# Patient Record
Sex: FEMALE | Race: WHITE | NOT HISPANIC OR LATINO
[De-identification: names, ages, dates, MRNs, and addresses within clinical notes are randomized per-mention and may not be internally consistent; named-entity substitution may affect disease eponyms.]

---

## 2017-09-29 ENCOUNTER — RX RENEWAL (OUTPATIENT)
Age: 32
End: 2017-09-29

## 2017-09-29 ENCOUNTER — TRANSCRIPTION ENCOUNTER (OUTPATIENT)
Age: 32
End: 2017-09-29

## 2017-11-06 ENCOUNTER — APPOINTMENT (OUTPATIENT)
Dept: OBGYN | Facility: CLINIC | Age: 32
End: 2017-11-06
Payer: COMMERCIAL

## 2017-11-06 VITALS
BODY MASS INDEX: 37.76 KG/M2 | DIASTOLIC BLOOD PRESSURE: 86 MMHG | SYSTOLIC BLOOD PRESSURE: 129 MMHG | HEIGHT: 61 IN | WEIGHT: 200 LBS

## 2017-11-06 PROCEDURE — 99395 PREV VISIT EST AGE 18-39: CPT

## 2017-11-06 RX ORDER — NORGESTIMATE AND ETHINYL ESTRADIOL 7DAYSX3 LO
0.18/0.215/0.25 KIT ORAL
Qty: 84 | Refills: 3 | Status: ACTIVE | COMMUNITY
Start: 2017-11-06 | End: 1900-01-01

## 2017-11-08 LAB
C TRACH RRNA SPEC QL NAA+PROBE: NOT DETECTED
HPV HIGH+LOW RISK DNA PNL CVX: DETECTED
N GONORRHOEA RRNA SPEC QL NAA+PROBE: NOT DETECTED
SOURCE TP AMPLIFICATION: NORMAL

## 2017-11-09 LAB — CYTOLOGY CVX/VAG DOC THIN PREP: NORMAL

## 2018-04-19 ENCOUNTER — RX RENEWAL (OUTPATIENT)
Age: 33
End: 2018-04-19

## 2018-04-25 ENCOUNTER — APPOINTMENT (OUTPATIENT)
Dept: OBGYN | Facility: CLINIC | Age: 33
End: 2018-04-25
Payer: COMMERCIAL

## 2018-04-25 VITALS
BODY MASS INDEX: 37.76 KG/M2 | DIASTOLIC BLOOD PRESSURE: 88 MMHG | HEIGHT: 61 IN | SYSTOLIC BLOOD PRESSURE: 136 MMHG | WEIGHT: 200 LBS

## 2018-04-25 DIAGNOSIS — L29.8 OTHER PRURITUS: ICD-10-CM

## 2018-04-25 PROCEDURE — 99213 OFFICE O/P EST LOW 20 MIN: CPT

## 2018-04-27 LAB
CANDIDA VAG CYTO: NOT DETECTED
G VAGINALIS+PREV SP MTYP VAG QL MICRO: NOT DETECTED
T VAGINALIS VAG QL WET PREP: NOT DETECTED

## 2018-05-09 ENCOUNTER — APPOINTMENT (OUTPATIENT)
Dept: OBGYN | Facility: CLINIC | Age: 33
End: 2018-05-09
Payer: COMMERCIAL

## 2018-05-09 DIAGNOSIS — B97.7 PAPILLOMAVIRUS AS THE CAUSE OF DISEASES CLASSIFIED ELSEWHERE: ICD-10-CM

## 2018-05-09 LAB — HCG UR QL: NEGATIVE

## 2018-05-09 PROCEDURE — 57454 BX/CURETT OF CERVIX W/SCOPE: CPT

## 2018-05-09 PROCEDURE — 81025 URINE PREGNANCY TEST: CPT

## 2018-05-17 LAB — CORE LAB BIOPSY: NORMAL

## 2018-11-13 ENCOUNTER — RX RENEWAL (OUTPATIENT)
Age: 33
End: 2018-11-13

## 2019-02-05 ENCOUNTER — MEDICATION RENEWAL (OUTPATIENT)
Age: 34
End: 2019-02-05

## 2019-02-06 ENCOUNTER — RX RENEWAL (OUTPATIENT)
Age: 34
End: 2019-02-06

## 2019-02-06 RX ORDER — NORGESTIMATE AND ETHINYL ESTRADIOL 7DAYSX3 LO
0.18/0.215/0.25 KIT ORAL
Qty: 84 | Refills: 0 | Status: ACTIVE | COMMUNITY
Start: 2018-11-13 | End: 1900-01-01

## 2019-02-07 ENCOUNTER — APPOINTMENT (OUTPATIENT)
Dept: OBGYN | Facility: CLINIC | Age: 34
End: 2019-02-07
Payer: COMMERCIAL

## 2019-02-07 VITALS
BODY MASS INDEX: 37.76 KG/M2 | WEIGHT: 200 LBS | HEIGHT: 61 IN | DIASTOLIC BLOOD PRESSURE: 90 MMHG | SYSTOLIC BLOOD PRESSURE: 138 MMHG

## 2019-02-07 PROCEDURE — 99395 PREV VISIT EST AGE 18-39: CPT

## 2019-02-13 LAB
CYTOLOGY CVX/VAG DOC THIN PREP: NORMAL
HPV HIGH+LOW RISK DNA PNL CVX: DETECTED

## 2019-03-11 ENCOUNTER — RX RENEWAL (OUTPATIENT)
Age: 34
End: 2019-03-11

## 2019-04-19 ENCOUNTER — TRANSCRIPTION ENCOUNTER (OUTPATIENT)
Age: 34
End: 2019-04-19

## 2019-11-10 ENCOUNTER — TRANSCRIPTION ENCOUNTER (OUTPATIENT)
Age: 34
End: 2019-11-10

## 2020-01-06 ENCOUNTER — RX RENEWAL (OUTPATIENT)
Age: 35
End: 2020-01-06

## 2020-01-06 RX ORDER — NORGESTIMATE AND ETHINYL ESTRADIOL 7DAYSX3 LO
0.18/0.215/0.25 KIT ORAL
Qty: 28 | Refills: 1 | Status: ACTIVE | COMMUNITY
Start: 2019-02-07 | End: 1900-01-01

## 2021-01-26 ENCOUNTER — TRANSCRIPTION ENCOUNTER (OUTPATIENT)
Age: 36
End: 2021-01-26

## 2021-03-04 ENCOUNTER — RX RENEWAL (OUTPATIENT)
Age: 36
End: 2021-03-04

## 2021-03-11 ENCOUNTER — APPOINTMENT (OUTPATIENT)
Dept: OBGYN | Facility: CLINIC | Age: 36
End: 2021-03-11
Payer: COMMERCIAL

## 2021-03-11 VITALS
WEIGHT: 200 LBS | HEIGHT: 61 IN | BODY MASS INDEX: 37.76 KG/M2 | DIASTOLIC BLOOD PRESSURE: 83 MMHG | SYSTOLIC BLOOD PRESSURE: 134 MMHG

## 2021-03-11 PROCEDURE — 99395 PREV VISIT EST AGE 18-39: CPT

## 2021-03-11 PROCEDURE — 99072 ADDL SUPL MATRL&STAF TM PHE: CPT

## 2021-03-11 NOTE — HISTORY OF PRESENT ILLNESS
[FreeTextEntry1] : pt is a 35 y/o p0 lmp 2/28 presents for annual gyn visit  [No] : Patient does not have concerns regarding sex

## 2021-03-15 LAB — HPV HIGH+LOW RISK DNA PNL CVX: NOT DETECTED

## 2021-03-16 LAB — CYTOLOGY CVX/VAG DOC THIN PREP: NORMAL

## 2021-12-03 ENCOUNTER — TRANSCRIPTION ENCOUNTER (OUTPATIENT)
Age: 36
End: 2021-12-03

## 2022-01-28 ENCOUNTER — RX RENEWAL (OUTPATIENT)
Age: 37
End: 2022-01-28

## 2022-04-21 ENCOUNTER — RX RENEWAL (OUTPATIENT)
Age: 37
End: 2022-04-21

## 2022-04-26 ENCOUNTER — NON-APPOINTMENT (OUTPATIENT)
Age: 37
End: 2022-04-26

## 2022-05-02 ENCOUNTER — APPOINTMENT (OUTPATIENT)
Dept: OBGYN | Facility: CLINIC | Age: 37
End: 2022-05-02
Payer: COMMERCIAL

## 2022-05-02 VITALS
SYSTOLIC BLOOD PRESSURE: 145 MMHG | HEIGHT: 61 IN | DIASTOLIC BLOOD PRESSURE: 83 MMHG | BODY MASS INDEX: 37.76 KG/M2 | WEIGHT: 200 LBS

## 2022-05-02 DIAGNOSIS — R92.2 INCONCLUSIVE MAMMOGRAM: ICD-10-CM

## 2022-05-02 PROCEDURE — 99395 PREV VISIT EST AGE 18-39: CPT

## 2022-05-02 NOTE — HISTORY OF PRESENT ILLNESS
[FreeTextEntry1] : pt is a 36 y/o p0 lmp 3/17 presents for annual gyn visit bilateral axillary soreness

## 2022-05-04 ENCOUNTER — TRANSCRIPTION ENCOUNTER (OUTPATIENT)
Age: 37
End: 2022-05-04

## 2022-05-05 ENCOUNTER — NON-APPOINTMENT (OUTPATIENT)
Age: 37
End: 2022-05-05

## 2022-05-06 LAB
CYTOLOGY CVX/VAG DOC THIN PREP: NORMAL
HPV HIGH+LOW RISK DNA PNL CVX: DETECTED

## 2022-05-08 ENCOUNTER — NON-APPOINTMENT (OUTPATIENT)
Age: 37
End: 2022-05-08

## 2022-05-18 ENCOUNTER — NON-APPOINTMENT (OUTPATIENT)
Age: 37
End: 2022-05-18

## 2022-12-14 ENCOUNTER — NON-APPOINTMENT (OUTPATIENT)
Age: 37
End: 2022-12-14

## 2023-04-04 ENCOUNTER — NON-APPOINTMENT (OUTPATIENT)
Age: 38
End: 2023-04-04

## 2023-04-05 ENCOUNTER — RX RENEWAL (OUTPATIENT)
Age: 38
End: 2023-04-05

## 2023-04-05 RX ORDER — NORGESTIMATE AND ETHINYL ESTRADIOL 7DAYSX3 LO
0.18/0.215/0.25 KIT ORAL
Qty: 84 | Refills: 3 | Status: ACTIVE | COMMUNITY
Start: 2021-03-11 | End: 1900-01-01

## 2023-04-20 ENCOUNTER — NON-APPOINTMENT (OUTPATIENT)
Age: 38
End: 2023-04-20

## 2023-04-20 ENCOUNTER — APPOINTMENT (OUTPATIENT)
Dept: OBGYN | Facility: CLINIC | Age: 38
End: 2023-04-20
Payer: COMMERCIAL

## 2023-04-20 VITALS
DIASTOLIC BLOOD PRESSURE: 87 MMHG | SYSTOLIC BLOOD PRESSURE: 130 MMHG | WEIGHT: 200 LBS | HEIGHT: 61 IN | BODY MASS INDEX: 37.76 KG/M2

## 2023-04-20 DIAGNOSIS — N90.89 OTHER SPECIFIED NONINFLAMMATORY DISORDERS OF VULVA AND PERINEUM: ICD-10-CM

## 2023-04-20 PROCEDURE — 99213 OFFICE O/P EST LOW 20 MIN: CPT

## 2023-04-20 NOTE — DISCUSSION/SUMMARY
[FreeTextEntry1] : likely draining small vulvar abscess\par recommend warm compress/sitz bath\par f/u with Dr. Noguera as scheduled for annual to reasses

## 2023-04-20 NOTE — PHYSICAL EXAM
[Alert] : alert [Appropriately responsive] : appropriately responsive [No Acute Distress] : no acute distress [Oriented x3] : oriented x3 [FreeTextEntry1] : pinpoint ulceration left vulva near introitus  - drainig scant yellow fluid - no induration, no edema, no erythema, minimally tender

## 2023-04-20 NOTE — HISTORY OF PRESENT ILLNESS
[TextBox_4] : Pt presents with c/o noticing a sore on the inside of her labia since the weekend, feels tender - today has been the best it has been all week.  Has to dab it when she pees because its sore and theres a little blood there.  Has never had this before. Denies fever or chills.  Denies h/o genital herpes.  Is sexually active with .  DId have shingles 2014.

## 2023-04-21 LAB
HSV+VZV DNA SPEC QL NAA+PROBE: NOT DETECTED
SPECIMEN SOURCE: NORMAL

## 2023-04-24 ENCOUNTER — NON-APPOINTMENT (OUTPATIENT)
Age: 38
End: 2023-04-24

## 2023-05-04 ENCOUNTER — APPOINTMENT (OUTPATIENT)
Dept: OBGYN | Facility: CLINIC | Age: 38
End: 2023-05-04
Payer: COMMERCIAL

## 2023-05-04 VITALS
SYSTOLIC BLOOD PRESSURE: 142 MMHG | WEIGHT: 213 LBS | HEIGHT: 61 IN | DIASTOLIC BLOOD PRESSURE: 88 MMHG | BODY MASS INDEX: 40.22 KG/M2

## 2023-05-04 DIAGNOSIS — Z01.419 ENCOUNTER FOR GYNECOLOGICAL EXAMINATION (GENERAL) (ROUTINE) W/OUT ABNORMAL FINDINGS: ICD-10-CM

## 2023-05-04 PROCEDURE — 99396 PREV VISIT EST AGE 40-64: CPT

## 2023-05-08 ENCOUNTER — NON-APPOINTMENT (OUTPATIENT)
Age: 38
End: 2023-05-08

## 2023-05-08 LAB — HPV HIGH+LOW RISK DNA PNL CVX: DETECTED

## 2023-05-09 LAB — CYTOLOGY CVX/VAG DOC THIN PREP: NORMAL

## 2023-10-16 ENCOUNTER — NON-APPOINTMENT (OUTPATIENT)
Age: 38
End: 2023-10-16

## 2024-01-17 ENCOUNTER — NON-APPOINTMENT (OUTPATIENT)
Age: 39
End: 2024-01-17

## 2024-06-06 ENCOUNTER — RX RENEWAL (OUTPATIENT)
Age: 39
End: 2024-06-06

## 2024-06-26 RX ORDER — NORGESTIMATE AND ETHINYL ESTRADIOL 7DAYSX3 LO
0.18/0.215/0.25 KIT ORAL
Qty: 28 | Refills: 0 | Status: ACTIVE | COMMUNITY
Start: 2023-05-04 | End: 1900-01-01

## 2024-07-03 ENCOUNTER — NON-APPOINTMENT (OUTPATIENT)
Age: 39
End: 2024-07-03

## 2024-07-15 ENCOUNTER — NON-APPOINTMENT (OUTPATIENT)
Age: 39
End: 2024-07-15

## 2024-07-15 ENCOUNTER — APPOINTMENT (OUTPATIENT)
Dept: OBGYN | Facility: CLINIC | Age: 39
End: 2024-07-15
Payer: COMMERCIAL

## 2024-07-15 VITALS
BODY MASS INDEX: 37.95 KG/M2 | HEIGHT: 61 IN | WEIGHT: 201 LBS | SYSTOLIC BLOOD PRESSURE: 129 MMHG | DIASTOLIC BLOOD PRESSURE: 87 MMHG

## 2024-07-15 DIAGNOSIS — Z01.419 ENCOUNTER FOR GYNECOLOGICAL EXAMINATION (GENERAL) (ROUTINE) W/OUT ABNORMAL FINDINGS: ICD-10-CM

## 2024-07-15 DIAGNOSIS — R10.2 PELVIC AND PERINEAL PAIN: ICD-10-CM

## 2024-07-15 DIAGNOSIS — Z91.89 OTHER SPECIFIED PERSONAL RISK FACTORS, NOT ELSEWHERE CLASSIFIED: ICD-10-CM

## 2024-07-15 PROCEDURE — 99396 PREV VISIT EST AGE 40-64: CPT

## 2024-07-17 LAB — HPV HIGH+LOW RISK DNA PNL CVX: NOT DETECTED

## 2024-07-19 LAB — CYTOLOGY CVX/VAG DOC THIN PREP: NORMAL

## 2024-07-24 ENCOUNTER — ASOB RESULT (OUTPATIENT)
Age: 39
End: 2024-07-24

## 2024-07-24 ENCOUNTER — APPOINTMENT (OUTPATIENT)
Dept: OBGYN | Facility: CLINIC | Age: 39
End: 2024-07-24
Payer: COMMERCIAL

## 2024-07-24 PROCEDURE — 76830 TRANSVAGINAL US NON-OB: CPT

## 2024-07-29 ENCOUNTER — NON-APPOINTMENT (OUTPATIENT)
Age: 39
End: 2024-07-29

## 2024-07-30 ENCOUNTER — APPOINTMENT (OUTPATIENT)
Dept: SURGERY | Facility: CLINIC | Age: 39
End: 2024-07-30
Payer: COMMERCIAL

## 2024-07-30 VITALS
TEMPERATURE: 98.6 F | SYSTOLIC BLOOD PRESSURE: 137 MMHG | HEIGHT: 61 IN | BODY MASS INDEX: 37.95 KG/M2 | DIASTOLIC BLOOD PRESSURE: 84 MMHG | OXYGEN SATURATION: 99 % | HEART RATE: 87 BPM | WEIGHT: 201 LBS

## 2024-07-30 DIAGNOSIS — K80.20 CALCULUS OF GALLBLADDER W/OUT CHOLECYSTITIS W/OUT OBSTRUCTION: ICD-10-CM

## 2024-07-30 DIAGNOSIS — E66.9 OBESITY, UNSPECIFIED: ICD-10-CM

## 2024-07-30 PROCEDURE — 99205 OFFICE O/P NEW HI 60 MIN: CPT

## 2024-07-30 NOTE — PHYSICAL EXAM
[Normal Breath Sounds] : Normal breath sounds [Normal Heart Sounds] : normal heart sounds [Normal Rate and Rhythm] : normal rate and rhythm [No Rash or Lesion] : No rash or lesion [Alert] : alert [Oriented to Person] : oriented to person [Oriented to Place] : oriented to place [Oriented to Time] : oriented to time [Calm] : calm [de-identified] : Obese young woman in no acute distress [de-identified] : NCAT, PERRLA, EOMI.  No scleral icterus or jaundice [de-identified] : Obese, soft, nontender, nondistended, positive bowel sounds in all four quadrants.  No hernia or masses.  Negative Conklin sign [de-identified] : Ambulating without difficulty or assistance

## 2024-07-30 NOTE — ASSESSMENT
[FreeTextEntry1] : 39-year-old obese woman presenting for evaluation of incidentally identified gallstones.  The patient had an outpatient ultrasonography done which demonstrated a 1.5 cm stone impacted in the gallbladder neck.  She describes no adverse events or symptoms.  Denies biliary colic, postprandial nausea or vomiting.  She describes only intermittent dyspepsia and reflux.  She has a history of hyperlipidemia and is presently on statins.  Denies family history of gallstones.  Ultrasound has been reviewed.  Performed at [facility] radiology in June 2024.  No signs of acute cholecystitis but again a 1.5 cm gallstone was identified in the neck of the gallbladder.  No sonographic Conklin sign.  Examination today the patient again remains asymptomatic.  There is no right upper quadrant discomfort tenderness or palpable masses.  I discussed with her elective outpatient cholecystectomy.  Laparoscopic versus robotic assisted approaches.  I recommended robotic assisted laparoscopic cholecystectomy with ICG cholangiography. Risk, Benefits, and Alternatives to surgery have been discussed.  This includes but is not limited to bleeding, infection, damage to adjacent structures, need for additional surgery or interventions, adverse effects of anesthesia such as cardio-respiratory complications, prolonged intubation, cardiac arrhythmia, arrest, and or death.  Risks of forgoing surgery have also been discussed including progression of, and/or worsening of current condition which may then require urgent or emergent treatment or surgery.  Educational materials courtesy of the [employer] with respect to cholecystectomy have been provided for the patient's review and all questions have been answered.  Routine presurgical testing will be arranged if the patient wishes to move forward with surgery and she will be electively scheduled.  Presently however she wishes to discuss further with her  as far as determination of where she was to pursue surgery.  She has been instructed to contact my  should she wish to proceed.  Otherwise follow-up as needed  Nutritional and weight loss counseling has been provided. The importance of weight reduction in the treatment of Obesity and its contribution to resolution of obesity related comorbidities has been discussed.  The patient is encouraged to remain calorie conscious and continue a low fat, low carbohydrate, high protein diet. Eat slowly and chew food well.  Avoid eating and drinking simultaneously.  Also, emphasis has been placed on the importance of adequate hydration, multi-vitamin supplementation and exercise.  (15 min)

## 2024-07-30 NOTE — CONSULT LETTER
[Dear  ___] : Dear  [unfilled], [Courtesy Letter:] : I had the pleasure of seeing your patient, [unfilled], in my office today. [Please see my note below.] : Please see my note below. [Consult Closing:] : Thank you very much for allowing me to participate in the care of this patient.  If you have any questions, please do not hesitate to contact me. [Sincerely,] : Sincerely, [FreeTextEntry3] : Mairo Alfaro MD [facility] Advanced Minimally Invasive, Robotic, General & Bariatric Surgery Chair, Dept of Surgery, [facility] [address] [address] P: [phone] F: [phone]

## 2024-07-31 ENCOUNTER — APPOINTMENT (OUTPATIENT)
Dept: PULMONOLOGY | Facility: CLINIC | Age: 39
End: 2024-07-31
Payer: COMMERCIAL

## 2024-07-31 ENCOUNTER — LABORATORY RESULT (OUTPATIENT)
Age: 39
End: 2024-07-31

## 2024-07-31 VITALS
DIASTOLIC BLOOD PRESSURE: 91 MMHG | BODY MASS INDEX: 38.51 KG/M2 | WEIGHT: 204 LBS | OXYGEN SATURATION: 99 % | RESPIRATION RATE: 16 BRPM | TEMPERATURE: 98 F | SYSTOLIC BLOOD PRESSURE: 145 MMHG | HEART RATE: 93 BPM | HEIGHT: 61 IN

## 2024-07-31 DIAGNOSIS — R05.3 CHRONIC COUGH: ICD-10-CM

## 2024-07-31 PROCEDURE — 94729 DIFFUSING CAPACITY: CPT

## 2024-07-31 PROCEDURE — 36415 COLL VENOUS BLD VENIPUNCTURE: CPT

## 2024-07-31 PROCEDURE — 94727 GAS DIL/WSHOT DETER LNG VOL: CPT

## 2024-07-31 PROCEDURE — ZZZZZ: CPT

## 2024-07-31 PROCEDURE — 94060 EVALUATION OF WHEEZING: CPT

## 2024-07-31 PROCEDURE — 99205 OFFICE O/P NEW HI 60 MIN: CPT | Mod: 25

## 2024-07-31 RX ORDER — PANTOPRAZOLE 40 MG/1
40 TABLET, DELAYED RELEASE ORAL DAILY
Qty: 30 | Refills: 0 | Status: ACTIVE | COMMUNITY
Start: 2024-07-31 | End: 1900-01-01

## 2024-07-31 NOTE — HISTORY OF PRESENT ILLNESS
[Never] : never [TextBox_4] : Ms. LISA CHEN is a 39 year old woman with no sign PMH who is here for evaluation.   April 2020 - started getting mild cough that never went away. She describes mild dry intermittent cough. She feels "itchiness" in her lungs. She feels every breath she takes. Sometiems gets flares when she feels every breath she takes. This is associated with tongue "burring". Was seen by ENT and had unremarkable exam. Was tried on Prilosec for about 2 weeks with no change is sx.  No associated SOB, no wheezing, no chest tightness.  She only reports occ heartburn. Mild seasonal allergies. No pets at home.   ROS:  denies fevers, chills, night sweats, unintentional weight loss denies known autoimmune disease  PMH: tachycardia (on Metoprolol), HLD, no hx of childhood asthma Meds: per chart All: Penicillin SH: never smoker FH: Denies family hx of pulmonary disease PMD: JENNIFER AL

## 2024-07-31 NOTE — ASSESSMENT
[FreeTextEntry1] :  Ms. LISA CHEN is a 39 year old woman with no sign PMH who is here for evaluation.   #Cough -chronic mild cough ongoing since 2020. Describes "burning" in chest, no SOB, no wheezing. Lungs clear. Common etiologies of cough such as LPR, PND, allergies, underlying airway disease or other lung disease discussed with patient. -- PFTs normal today -- obtain CXR -- Obtain allergy panel, IgE, eosinophil count - if + allergies will try oral anti-histamines -- trial of PPI  All questions answered. Patient in agreement with plan.  Follow up in 6-8w  or sooner if needed.

## 2024-07-31 NOTE — CONSULT LETTER
[Dear  ___] : Dear  [unfilled], [Consult Letter:] : I had the pleasure of evaluating your patient, [unfilled]. [Please see my note below.] : Please see my note below. [Consult Closing:] : Thank you very much for allowing me to participate in the care of this patient.  If you have any questions, please do not hesitate to contact me. [FreeTextEntry3] : Sincerely,  Leann Momin MD [facility] tel: [phone] fax: [phone]

## 2024-07-31 NOTE — PHYSICAL EXAM
[No Acute Distress] : no acute distress [Normal Oropharynx] : normal oropharynx [Normal Appearance] : normal appearance [No Neck Mass] : no neck mass [Normal Rate/Rhythm] : normal rate/rhythm [Normal S1, S2] : normal s1, s2 [No Murmurs] : no murmurs [No Resp Distress] : no resp distress [No Abnormalities] : no abnormalities [Benign] : benign [Normal Gait] : normal gait [No Clubbing] : no clubbing [No Cyanosis] : no cyanosis [No Edema] : no edema [FROM] : FROM [Normal Color/ Pigmentation] : normal color/ pigmentation [No Focal Deficits] : no focal deficits [Oriented x3] : oriented x3 [Normal Affect] : normal affect [TextBox_68] : slightly decreased air movement

## 2024-08-06 LAB
A ALTERNATA IGE QN: <0.1 KUA/L
A FUMIGATUS IGE QN: <0.1 KUA/L
BASOPHILS # BLD AUTO: 0.07 K/UL
BASOPHILS NFR BLD AUTO: 0.7 %
BERMUDA GRASS IGE QN: <0.1 KUA/L
BOXELDER IGE QN: <0.1 KUA/L
C HERBARUM IGE QN: <0.1 KUA/L
CALIF WALNUT IGE QN: <0.1 KUA/L
CAT DANDER IGE QN: <0.1 KUA/L
CMN PIGWEED IGE QN: <0.1 KUA/L
COMMON RAGWEED IGE QN: 0.11 KUA/L
COTTONWOOD IGE QN: <0.1 KUA/L
D FARINAE IGE QN: <0.1 KUA/L
D PTERONYSS IGE QN: <0.1 KUA/L
DEPRECATED A ALTERNATA IGE RAST QL: 0 (ref 0–?)
DEPRECATED A FUMIGATUS IGE RAST QL: 0 (ref 0–?)
DEPRECATED BERMUDA GRASS IGE RAST QL: 0 (ref 0–?)
DEPRECATED BOXELDER IGE RAST QL: 0 (ref 0–?)
DEPRECATED C HERBARUM IGE RAST QL: 0 (ref 0–?)
DEPRECATED CAT DANDER IGE RAST QL: 0 (ref 0–?)
DEPRECATED COMMON PIGWEED IGE RAST QL: 0 (ref 0–?)
DEPRECATED COMMON RAGWEED IGE RAST QL: NORMAL (ref 0–?)
DEPRECATED COTTONWOOD IGE RAST QL: 0 (ref 0–?)
DEPRECATED D FARINAE IGE RAST QL: 0 (ref 0–?)
DEPRECATED D PTERONYSS IGE RAST QL: 0 (ref 0–?)
DEPRECATED DOG DANDER IGE RAST QL: 0 (ref 0–?)
DEPRECATED GOOSEFOOT IGE RAST QL: 0 (ref 0–?)
DEPRECATED LONDON PLANE IGE RAST QL: 0 (ref 0–?)
DEPRECATED MOUSE URINE PROT IGE RAST QL: 0 (ref 0–?)
DEPRECATED MUGWORT IGE RAST QL: NORMAL (ref 0–?)
DEPRECATED P NOTATUM IGE RAST QL: 0 (ref 0–?)
DEPRECATED RED CEDAR IGE RAST QL: 0 (ref 0–?)
DEPRECATED ROACH IGE RAST QL: 0 (ref 0–?)
DEPRECATED SHEEP SORREL IGE RAST QL: 0 (ref 0–?)
DEPRECATED SILVER BIRCH IGE RAST QL: 0 (ref 0–?)
DEPRECATED TIMOTHY IGE RAST QL: 0 (ref 0–?)
DEPRECATED WHITE ASH IGE RAST QL: 0 (ref 0–?)
DEPRECATED WHITE OAK IGE RAST QL: 0 (ref 0–?)
DOG DANDER IGE QN: <0.1 KUA/L
EOSINOPHIL # BLD AUTO: 0.19 K/UL
EOSINOPHIL NFR BLD AUTO: 1.8 %
GOOSEFOOT IGE QN: <0.1 KUA/L
HCT VFR BLD CALC: 42.6 %
HGB BLD-MCNC: 13 G/DL
IMM GRANULOCYTES NFR BLD AUTO: 0.5 %
LONDON PLANE IGE QN: <0.1 KUA/L
LYMPHOCYTES # BLD AUTO: 3.4 K/UL
LYMPHOCYTES NFR BLD AUTO: 31.7 %
MAN DIFF?: NORMAL
MCHC RBC-ENTMCNC: 28.1 PG
MCHC RBC-ENTMCNC: 30.5 GM/DL
MCV RBC AUTO: 92 FL
MONOCYTES # BLD AUTO: 0.63 K/UL
MONOCYTES NFR BLD AUTO: 5.9 %
MOUSE URINE PROT IGE QN: <0.1 KUA/L
MUGWORT IGE QN: 0.13 KUA/L
MULBERRY (T70) CLASS: 0 (ref 0–?)
MULBERRY (T70) CONC: <0.1 KUA/L
NEUTROPHILS # BLD AUTO: 6.39 K/UL
NEUTROPHILS NFR BLD AUTO: 59.4 %
P NOTATUM IGE QN: <0.1 KUA/L
PLATELET # BLD AUTO: 343 K/UL
RBC # BLD: 4.63 M/UL
RBC # FLD: 15 %
RED CEDAR IGE QN: <0.1 KUA/L
ROACH IGE QN: <0.1 KUA/L
SHEEP SORREL IGE QN: <0.1 KUA/L
SILVER BIRCH IGE QN: <0.1 KUA/L
TIMOTHY IGE QN: <0.1 KUA/L
TOTAL IGE SMQN RAST: 5 KU/L
TREE ALLERG MIX1 IGE QL: 0 (ref 0–?)
WBC # FLD AUTO: 10.73 K/UL
WHITE ASH IGE QN: <0.1 KUA/L
WHITE ELM IGE QN: 0 (ref 0–?)
WHITE ELM IGE QN: <0.1 KUA/L
WHITE OAK IGE QN: <0.1 KUA/L

## 2024-09-16 ENCOUNTER — APPOINTMENT (OUTPATIENT)
Dept: GASTROENTEROLOGY | Facility: CLINIC | Age: 39
End: 2024-09-16
Payer: COMMERCIAL

## 2024-09-16 VITALS
TEMPERATURE: 96.8 F | BODY MASS INDEX: 38.89 KG/M2 | HEIGHT: 61 IN | OXYGEN SATURATION: 99 % | SYSTOLIC BLOOD PRESSURE: 136 MMHG | WEIGHT: 206 LBS | DIASTOLIC BLOOD PRESSURE: 80 MMHG | HEART RATE: 94 BPM

## 2024-09-16 DIAGNOSIS — R11.0 NAUSEA: ICD-10-CM

## 2024-09-16 DIAGNOSIS — K21.9 GASTRO-ESOPHAGEAL REFLUX DISEASE W/OUT ESOPHAGITIS: ICD-10-CM

## 2024-09-16 DIAGNOSIS — R10.13 EPIGASTRIC PAIN: ICD-10-CM

## 2024-09-16 PROCEDURE — 99204 OFFICE O/P NEW MOD 45 MIN: CPT

## 2024-09-16 NOTE — HISTORY OF PRESENT ILLNESS
[FreeTextEntry1] : Patient is a 39-year-old female who presents to the gastroenterology office for evaluation.  Patient reports experiencing intermittent abdominal discomfort in the epigastric region and right upper quadrant for past several weeks.  She reports the abdominal discomfort is not specifically related to eating meals and can occur at any time.  Patient also reports experiencing intermittent nausea, acid reflux and heartburn over the past several weeks.  She reports her symptoms have improved over the past several days. She denies vomiting, difficulty swallowing or painful swallowing.  She reports having 3-4 bowel movements per day, which she reports is her baseline.  She denies red blood in stools and denies black stools.  She denies unintentional weight loss.  She denies family history of colon cancer or rectal cancer.

## 2024-09-16 NOTE — ASSESSMENT
[FreeTextEntry1] : Patient is a 39-year-old female with reported symptoms of intermittent abdominal discomfort in the epigastric region and right upper quadrant, nausea, acid reflux and heartburn over the past several weeks.  Patient did mention that her symptoms have improved over the past several days.  From GI perspective, would recommend upper endoscopy (EGD) for evaluation.  Discussed indications, benefits/risks and alternatives to upper endoscopy with the patient.  She reported understanding and is in agreement with proceeding with upper endoscopy.  All of her questions were answered.   Will also check labs for evaluation.   Patient was counseled on gastroesophageal reflux disease (GERD) dietary/lifestyle modifications.   --Check CBC, CMP, amylase, lipase.   --Schedule upper endoscopy (EGD).   --Gastroesophageal reflux disease (GERD) dietary/lifestyle modifications.

## 2024-09-17 LAB
ALBUMIN SERPL ELPH-MCNC: 4.5 G/DL
ALP BLD-CCNC: 61 U/L
ALT SERPL-CCNC: 12 U/L
AMYLASE/CREAT SERPL: 72 U/L
ANION GAP SERPL CALC-SCNC: 15 MMOL/L
AST SERPL-CCNC: 14 U/L
BASOPHILS # BLD AUTO: 0.06 K/UL
BASOPHILS NFR BLD AUTO: 0.6 %
BILIRUB SERPL-MCNC: <0.2 MG/DL
BUN SERPL-MCNC: 10 MG/DL
CALCIUM SERPL-MCNC: 9.5 MG/DL
CHLORIDE SERPL-SCNC: 103 MMOL/L
CO2 SERPL-SCNC: 21 MMOL/L
CREAT SERPL-MCNC: 0.65 MG/DL
EGFR: 113 ML/MIN/1.73M2
EOSINOPHIL # BLD AUTO: 0.1 K/UL
EOSINOPHIL NFR BLD AUTO: 1.1 %
GLUCOSE SERPL-MCNC: 110 MG/DL
HCT VFR BLD CALC: 38.3 %
HGB BLD-MCNC: 12.1 G/DL
IMM GRANULOCYTES NFR BLD AUTO: 0.2 %
LPL SERPL-CCNC: 46 U/L
LYMPHOCYTES # BLD AUTO: 2.69 K/UL
LYMPHOCYTES NFR BLD AUTO: 28.8 %
MAN DIFF?: NORMAL
MCHC RBC-ENTMCNC: 27.8 PG
MCHC RBC-ENTMCNC: 31.6 GM/DL
MCV RBC AUTO: 88 FL
MONOCYTES # BLD AUTO: 0.59 K/UL
MONOCYTES NFR BLD AUTO: 6.3 %
NEUTROPHILS # BLD AUTO: 5.89 K/UL
NEUTROPHILS NFR BLD AUTO: 63 %
PLATELET # BLD AUTO: 369 K/UL
POTASSIUM SERPL-SCNC: 4.4 MMOL/L
PROT SERPL-MCNC: 8 G/DL
RBC # BLD: 4.35 M/UL
RBC # FLD: 14.2 %
SODIUM SERPL-SCNC: 140 MMOL/L
WBC # FLD AUTO: 9.35 K/UL

## 2024-09-23 ENCOUNTER — NON-APPOINTMENT (OUTPATIENT)
Age: 39
End: 2024-09-23

## 2024-09-27 ENCOUNTER — APPOINTMENT (OUTPATIENT)
Dept: PULMONOLOGY | Facility: CLINIC | Age: 39
End: 2024-09-27
Payer: COMMERCIAL

## 2024-09-27 VITALS
OXYGEN SATURATION: 99 % | SYSTOLIC BLOOD PRESSURE: 130 MMHG | BODY MASS INDEX: 38.89 KG/M2 | HEART RATE: 89 BPM | WEIGHT: 206 LBS | DIASTOLIC BLOOD PRESSURE: 85 MMHG | HEIGHT: 61 IN

## 2024-09-27 DIAGNOSIS — R05.3 CHRONIC COUGH: ICD-10-CM

## 2024-09-27 PROCEDURE — 99214 OFFICE O/P EST MOD 30 MIN: CPT

## 2024-09-27 NOTE — HISTORY OF PRESENT ILLNESS
[Never] : never [TextBox_4] : Ms. LISA CHEN is a 39 year old woman with no sign PMH who is here for evaluation.   Initial Hx:  April 2020 - started getting mild cough that never went away. She describes mild dry intermittent cough. She feels "itchiness" in her lungs. She feels every breath she takes. Sometiems gets flares when she feels every breath she takes. This is associated with tongue "burring". Was seen by ENT and had unremarkable exam. Was tried on Prilosec for about 2 weeks with no change is sx.  No associated SOB, no wheezing, no chest tightness.  She only reports occ heartburn. Mild seasonal allergies. No pets at home.   Interval events: Last visit, was started on PPI with significant improvement in cough.  After, developed gas and abdominal pain and stopped.  Now noticed cough is returning She was evaluated by GI and has endoscopy scheduled  ROS:  denies fevers, chills, night sweats, unintentional weight loss denies known autoimmune disease  PMH: tachycardia (on Metoprolol), HLD, no hx of childhood asthma Meds: per chart All: Penicillin SH: never smoker FH: Denies family hx of pulmonary disease PMD: JENNIFER AL

## 2024-09-27 NOTE — ASSESSMENT
[FreeTextEntry1] : Ms. LISA CHEN is a 39 year old woman with no sign PMH who is here for evaluation.   #Cough -chronic mild cough ongoing since 2020. Mostly likely reflux related Cough significantly improved after initiation of PPI and is now coming back after stopping therapy.  PFTs 7/2024 -  normal.  Allergy panel unremarkable. CXR 9/2024 with clear lungs. f/u with GI for endoscopy as scheduled can use Famotidine for sx  f/u prn

## 2024-09-27 NOTE — CONSULT LETTER
[Dear  ___] : Dear  [unfilled], [Consult Letter:] : I had the pleasure of evaluating your patient, [unfilled]. [Please see my note below.] : Please see my note below. [Consult Closing:] : Thank you very much for allowing me to participate in the care of this patient.  If you have any questions, please do not hesitate to contact me. [FreeTextEntry3] : Sincerely,  Leann Momin MD [facility] tel: [phone] fax: [phone]

## 2024-10-22 ENCOUNTER — NON-APPOINTMENT (OUTPATIENT)
Age: 39
End: 2024-10-22

## 2024-10-29 ENCOUNTER — NON-APPOINTMENT (OUTPATIENT)
Age: 39
End: 2024-10-29

## 2024-10-29 ENCOUNTER — APPOINTMENT (OUTPATIENT)
Dept: GASTROENTEROLOGY | Facility: AMBULATORY MEDICAL SERVICES | Age: 39
End: 2024-10-29
Payer: COMMERCIAL

## 2024-10-29 DIAGNOSIS — K20.90 ESOPHAGITIS, UNSPECIFIED WITHOUT BLEEDING: ICD-10-CM

## 2024-10-29 PROCEDURE — 43239 EGD BIOPSY SINGLE/MULTIPLE: CPT

## 2024-10-29 RX ORDER — OMEPRAZOLE 20 MG/1
20 CAPSULE, DELAYED RELEASE ORAL TWICE DAILY
Qty: 180 | Refills: 0 | Status: ACTIVE | COMMUNITY
Start: 2024-10-29 | End: 1900-01-01

## 2024-12-16 ENCOUNTER — APPOINTMENT (OUTPATIENT)
Dept: GASTROENTEROLOGY | Facility: CLINIC | Age: 39
End: 2024-12-16
Payer: COMMERCIAL

## 2024-12-16 VITALS
WEIGHT: 210 LBS | BODY MASS INDEX: 39.65 KG/M2 | DIASTOLIC BLOOD PRESSURE: 78 MMHG | HEIGHT: 61 IN | TEMPERATURE: 97.8 F | SYSTOLIC BLOOD PRESSURE: 118 MMHG | HEART RATE: 88 BPM | OXYGEN SATURATION: 100 %

## 2024-12-16 DIAGNOSIS — K20.90 ESOPHAGITIS, UNSPECIFIED WITHOUT BLEEDING: ICD-10-CM

## 2024-12-16 DIAGNOSIS — K21.9 GASTRO-ESOPHAGEAL REFLUX DISEASE W/OUT ESOPHAGITIS: ICD-10-CM

## 2024-12-16 DIAGNOSIS — R10.13 EPIGASTRIC PAIN: ICD-10-CM

## 2024-12-16 PROCEDURE — 99214 OFFICE O/P EST MOD 30 MIN: CPT

## 2025-01-21 ENCOUNTER — RX RENEWAL (OUTPATIENT)
Age: 40
End: 2025-01-21

## 2025-02-24 ENCOUNTER — NON-APPOINTMENT (OUTPATIENT)
Age: 40
End: 2025-02-24

## 2025-03-27 ENCOUNTER — APPOINTMENT (OUTPATIENT)
Dept: GASTROENTEROLOGY | Facility: AMBULATORY MEDICAL SERVICES | Age: 40
End: 2025-03-27
Payer: COMMERCIAL

## 2025-03-27 PROCEDURE — 43239 EGD BIOPSY SINGLE/MULTIPLE: CPT

## 2025-04-01 ENCOUNTER — RX RENEWAL (OUTPATIENT)
Age: 40
End: 2025-04-01

## 2025-04-11 ENCOUNTER — RESULT REVIEW (OUTPATIENT)
Age: 40
End: 2025-04-11

## 2025-04-11 ENCOUNTER — APPOINTMENT (OUTPATIENT)
Dept: MRI IMAGING | Facility: CLINIC | Age: 40
End: 2025-04-11
Payer: COMMERCIAL

## 2025-04-11 PROCEDURE — 70551 MRI BRAIN STEM W/O DYE: CPT

## 2025-04-21 ENCOUNTER — NON-APPOINTMENT (OUTPATIENT)
Age: 40
End: 2025-04-21

## 2025-04-23 ENCOUNTER — APPOINTMENT (OUTPATIENT)
Dept: PAIN MANAGEMENT | Facility: CLINIC | Age: 40
End: 2025-04-23

## 2025-04-23 VITALS
DIASTOLIC BLOOD PRESSURE: 80 MMHG | HEIGHT: 61 IN | BODY MASS INDEX: 38.71 KG/M2 | WEIGHT: 205 LBS | SYSTOLIC BLOOD PRESSURE: 130 MMHG | HEART RATE: 86 BPM

## 2025-04-23 PROCEDURE — 99203 OFFICE O/P NEW LOW 30 MIN: CPT

## 2025-05-12 ENCOUNTER — APPOINTMENT (OUTPATIENT)
Dept: PULMONOLOGY | Facility: CLINIC | Age: 40
End: 2025-05-12
Payer: COMMERCIAL

## 2025-05-12 VITALS
DIASTOLIC BLOOD PRESSURE: 88 MMHG | WEIGHT: 206.56 LBS | TEMPERATURE: 98.1 F | HEART RATE: 88 BPM | OXYGEN SATURATION: 98 % | HEIGHT: 61 IN | RESPIRATION RATE: 16 BRPM | BODY MASS INDEX: 39 KG/M2 | SYSTOLIC BLOOD PRESSURE: 138 MMHG

## 2025-05-12 DIAGNOSIS — R05.3 CHRONIC COUGH: ICD-10-CM

## 2025-05-12 PROCEDURE — 94010 BREATHING CAPACITY TEST: CPT

## 2025-05-12 PROCEDURE — 99215 OFFICE O/P EST HI 40 MIN: CPT | Mod: 25

## 2025-05-12 RX ORDER — ALBUTEROL SULFATE AND BUDESONIDE 90; 80 UG/1; UG/1
90-80 AEROSOL, METERED RESPIRATORY (INHALATION)
Qty: 1 | Refills: 3 | Status: ACTIVE | COMMUNITY
Start: 2025-05-12 | End: 1900-01-01

## 2025-05-28 ENCOUNTER — EMERGENCY (EMERGENCY)
Facility: HOSPITAL | Age: 40
LOS: 1 days | End: 2025-05-28
Attending: EMERGENCY MEDICINE | Admitting: EMERGENCY MEDICINE
Payer: COMMERCIAL

## 2025-05-28 VITALS
TEMPERATURE: 98 F | DIASTOLIC BLOOD PRESSURE: 67 MMHG | RESPIRATION RATE: 18 BRPM | HEART RATE: 79 BPM | OXYGEN SATURATION: 98 % | SYSTOLIC BLOOD PRESSURE: 104 MMHG

## 2025-05-28 VITALS
RESPIRATION RATE: 18 BRPM | DIASTOLIC BLOOD PRESSURE: 74 MMHG | WEIGHT: 203.93 LBS | HEART RATE: 110 BPM | OXYGEN SATURATION: 100 % | TEMPERATURE: 98 F | SYSTOLIC BLOOD PRESSURE: 146 MMHG | HEIGHT: 61 IN

## 2025-05-28 LAB
ALBUMIN SERPL ELPH-MCNC: 3.4 G/DL — SIGNIFICANT CHANGE UP (ref 3.3–5)
ALP SERPL-CCNC: 61 U/L — SIGNIFICANT CHANGE UP (ref 40–120)
ALT FLD-CCNC: 15 U/L — SIGNIFICANT CHANGE UP (ref 12–78)
ANION GAP SERPL CALC-SCNC: 10 MMOL/L — SIGNIFICANT CHANGE UP (ref 5–17)
AST SERPL-CCNC: 12 U/L — LOW (ref 15–37)
BASOPHILS # BLD AUTO: 0.06 K/UL — SIGNIFICANT CHANGE UP (ref 0–0.2)
BASOPHILS NFR BLD AUTO: 0.4 % — SIGNIFICANT CHANGE UP (ref 0–2)
BILIRUB SERPL-MCNC: 0.2 MG/DL — SIGNIFICANT CHANGE UP (ref 0.2–1.2)
BUN SERPL-MCNC: 12 MG/DL — SIGNIFICANT CHANGE UP (ref 7–23)
CALCIUM SERPL-MCNC: 9.4 MG/DL — SIGNIFICANT CHANGE UP (ref 8.5–10.1)
CHLORIDE SERPL-SCNC: 103 MMOL/L — SIGNIFICANT CHANGE UP (ref 96–108)
CO2 SERPL-SCNC: 25 MMOL/L — SIGNIFICANT CHANGE UP (ref 22–31)
CREAT SERPL-MCNC: 0.84 MG/DL — SIGNIFICANT CHANGE UP (ref 0.5–1.3)
EGFR: 89 ML/MIN/1.73M2 — SIGNIFICANT CHANGE UP
EGFR: 89 ML/MIN/1.73M2 — SIGNIFICANT CHANGE UP
EOSINOPHIL # BLD AUTO: 0.04 K/UL — SIGNIFICANT CHANGE UP (ref 0–0.5)
EOSINOPHIL NFR BLD AUTO: 0.3 % — SIGNIFICANT CHANGE UP (ref 0–6)
GLUCOSE SERPL-MCNC: 105 MG/DL — HIGH (ref 70–99)
HCT VFR BLD CALC: 36.3 % — SIGNIFICANT CHANGE UP (ref 34.5–45)
HGB BLD-MCNC: 12.2 G/DL — SIGNIFICANT CHANGE UP (ref 11.5–15.5)
IMM GRANULOCYTES NFR BLD AUTO: 0.3 % — SIGNIFICANT CHANGE UP (ref 0–0.9)
LYMPHOCYTES # BLD AUTO: 2.89 K/UL — SIGNIFICANT CHANGE UP (ref 1–3.3)
LYMPHOCYTES # BLD AUTO: 20.1 % — SIGNIFICANT CHANGE UP (ref 13–44)
MAGNESIUM SERPL-MCNC: 2.4 MG/DL — SIGNIFICANT CHANGE UP (ref 1.6–2.6)
MCHC RBC-ENTMCNC: 27.6 PG — SIGNIFICANT CHANGE UP (ref 27–34)
MCHC RBC-ENTMCNC: 33.6 G/DL — SIGNIFICANT CHANGE UP (ref 32–36)
MCV RBC AUTO: 82.1 FL — SIGNIFICANT CHANGE UP (ref 80–100)
MONOCYTES # BLD AUTO: 0.69 K/UL — SIGNIFICANT CHANGE UP (ref 0–0.9)
MONOCYTES NFR BLD AUTO: 4.8 % — SIGNIFICANT CHANGE UP (ref 2–14)
NEUTROPHILS # BLD AUTO: 10.67 K/UL — HIGH (ref 1.8–7.4)
NEUTROPHILS NFR BLD AUTO: 74.1 % — SIGNIFICANT CHANGE UP (ref 43–77)
NRBC BLD AUTO-RTO: 0 /100 WBCS — SIGNIFICANT CHANGE UP (ref 0–0)
PLATELET # BLD AUTO: 324 K/UL — SIGNIFICANT CHANGE UP (ref 150–400)
POTASSIUM SERPL-MCNC: 4.3 MMOL/L — SIGNIFICANT CHANGE UP (ref 3.5–5.3)
POTASSIUM SERPL-SCNC: 4.3 MMOL/L — SIGNIFICANT CHANGE UP (ref 3.5–5.3)
PROT SERPL-MCNC: 8.5 G/DL — HIGH (ref 6–8.3)
RBC # BLD: 4.42 M/UL — SIGNIFICANT CHANGE UP (ref 3.8–5.2)
RBC # FLD: 13.8 % — SIGNIFICANT CHANGE UP (ref 10.3–14.5)
SODIUM SERPL-SCNC: 138 MMOL/L — SIGNIFICANT CHANGE UP (ref 135–145)
T3 SERPL-MCNC: 221 NG/DL — HIGH (ref 80–200)
T4 AB SER-ACNC: 15.6 UG/DL — HIGH (ref 4.6–12)
T4 FREE SERPL-MCNC: 1.4 NG/DL — SIGNIFICANT CHANGE UP (ref 0.9–1.8)
TROPONIN I, HIGH SENSITIVITY RESULT: <3 NG/L — SIGNIFICANT CHANGE UP
TSH SERPL-MCNC: 2.64 UIU/ML — SIGNIFICANT CHANGE UP (ref 0.36–3.74)
WBC # BLD: 14.4 K/UL — HIGH (ref 3.8–10.5)
WBC # FLD AUTO: 14.4 K/UL — HIGH (ref 3.8–10.5)

## 2025-05-28 PROCEDURE — 36415 COLL VENOUS BLD VENIPUNCTURE: CPT

## 2025-05-28 PROCEDURE — 84484 ASSAY OF TROPONIN QUANT: CPT

## 2025-05-28 PROCEDURE — 84443 ASSAY THYROID STIM HORMONE: CPT

## 2025-05-28 PROCEDURE — 84439 ASSAY OF FREE THYROXINE: CPT

## 2025-05-28 PROCEDURE — 84480 ASSAY TRIIODOTHYRONINE (T3): CPT

## 2025-05-28 PROCEDURE — 99285 EMERGENCY DEPT VISIT HI MDM: CPT

## 2025-05-28 PROCEDURE — 83735 ASSAY OF MAGNESIUM: CPT

## 2025-05-28 PROCEDURE — 84436 ASSAY OF TOTAL THYROXINE: CPT

## 2025-05-28 PROCEDURE — 93005 ELECTROCARDIOGRAM TRACING: CPT

## 2025-05-28 PROCEDURE — 80053 COMPREHEN METABOLIC PANEL: CPT

## 2025-05-28 PROCEDURE — 85025 COMPLETE CBC W/AUTO DIFF WBC: CPT

## 2025-05-28 PROCEDURE — 99283 EMERGENCY DEPT VISIT LOW MDM: CPT

## 2025-05-28 PROCEDURE — 93010 ELECTROCARDIOGRAM REPORT: CPT

## 2025-05-28 RX ORDER — ALPRAZOLAM 0.5 MG
0.25 TABLET, EXTENDED RELEASE 24 HR ORAL ONCE
Refills: 0 | Status: DISCONTINUED | OUTPATIENT
Start: 2025-05-28 | End: 2025-05-28

## 2025-05-28 RX ADMIN — Medication 0.25 MILLIGRAM(S): at 07:49

## 2025-05-28 NOTE — ED ADULT TRIAGE NOTE - CHIEF COMPLAINT QUOTE
I feel like my heart is pounding. I don't feel like it's racing. I spoke w/ my cardiologist and have a monitor. For the past 4-5 nights it has kept me awake.

## 2025-05-28 NOTE — ED PROVIDER NOTE - DIFFERENTIAL DIAGNOSIS
ddx considered but not limted to anemia, arrhythmia, dehydration, anxiety, thyroid Differential Diagnosis

## 2025-05-28 NOTE — ED PROVIDER NOTE - OBJECTIVE STATEMENT
41yo female who presents with palpitations for the last day, pt states she felt her heart pounding, knowing her HR is regular, states it has happened in the past, with palpitations no sob no chest pain pt states she has appt next week with the cardiologist, denies issues with her thyroid, no smoking or caffeine

## 2025-05-28 NOTE — ED ADULT NURSE REASSESSMENT NOTE - NSFALLHARMRISKINTERV_ED_ALL_ED

## 2025-05-28 NOTE — ED ADULT NURSE NOTE - IS THE PATIENT ABLE TO BE SCREENED?
Chaperone chaperone present . Yes

## 2025-05-28 NOTE — ED ADULT NURSE NOTE - OBJECTIVE STATEMENT
39yo Female co palpitations since last night. Pt states "Since 15 years ago, I've had heart palpitations/pounding when I lay down in bed every few years. I've seen a cardiologist with no definitive diagnosis." Denies SOB, CP, thyroid issues, smoking, caffeine, anxiety dx. AOx4, ambulatory, blood work sent to lab, EKG completed upon arrival, HR 86 in room.

## 2025-05-28 NOTE — ED PROVIDER NOTE - PATIENT PORTAL LINK FT
You can access the FollowMyHealth Patient Portal offered by [facility] by registering at the following website: [email or web address]. By joining [facility]’s FollowMyHealth portal, you will also be able to view your health information using other applications (apps) compatible with our system.

## 2025-05-28 NOTE — ED ADULT TRIAGE NOTE - GLASGOW COMA SCALE: EYE OPENING, MLM
November 1, 2018    Valerie Valentine  [address] [place] [id number]    Dear Valerie,  We are happy to inform you that your PAP smear result from 10/25/18 is normal.  We are now able to do a follow up test on PAP smears. The DNA test is for HPV (Human Papilloma Virus). Cervical cancer is closely linked with certain types of HPV. Your results showed no evidence of high risk HPV.  Therefore we recommend you return in 5 years for your next pap smear and HPV test.  You will still need to return to the clinic every year for an annual exam and other preventive tests.  If you have additional questions regarding this result, please call our registered nurse, Margarita at [phone].  Sincerely,    HAYLEE Chavarria CNM/ken   (E4) spontaneous

## 2025-05-28 NOTE — ED ADULT NURSE REASSESSMENT NOTE - NS ED NURSE REASSESS COMMENT FT1
states thumping in chest subsided. no complaints at this time.
Patient received from MARIANO Mckeon. Patient awaiting blood draw, patient hard stick.

## 2025-05-28 NOTE — ED PROVIDER NOTE - PROGRESS NOTE DETAILS
spoke with pt about results, will follow up with her cardiologist next week, xanax did help, will discuss with pcp and return if any symtpoms worsen

## 2025-06-09 ENCOUNTER — APPOINTMENT (OUTPATIENT)
Dept: GASTROENTEROLOGY | Facility: CLINIC | Age: 40
End: 2025-06-09
Payer: COMMERCIAL

## 2025-06-09 VITALS
HEIGHT: 61 IN | TEMPERATURE: 97.5 F | DIASTOLIC BLOOD PRESSURE: 70 MMHG | WEIGHT: 204 LBS | BODY MASS INDEX: 38.51 KG/M2 | SYSTOLIC BLOOD PRESSURE: 120 MMHG

## 2025-06-09 PROCEDURE — 99213 OFFICE O/P EST LOW 20 MIN: CPT

## 2025-06-11 ENCOUNTER — TRANSCRIPTION ENCOUNTER (OUTPATIENT)
Age: 40
End: 2025-06-11

## 2025-07-03 ENCOUNTER — APPOINTMENT (OUTPATIENT)
Dept: CT IMAGING | Facility: CLINIC | Age: 40
End: 2025-07-03

## 2025-07-09 ENCOUNTER — APPOINTMENT (OUTPATIENT)
Dept: ORTHOPEDIC SURGERY | Facility: CLINIC | Age: 40
End: 2025-07-09
Payer: COMMERCIAL

## 2025-07-09 VITALS — WEIGHT: 204 LBS | BODY MASS INDEX: 38.51 KG/M2 | HEIGHT: 61 IN

## 2025-07-09 PROBLEM — S92.251A CLOSED DISPLACED FRACTURE OF NAVICULAR BONE OF RIGHT FOOT, INITIAL ENCOUNTER: Status: ACTIVE | Noted: 2025-07-09

## 2025-07-09 PROBLEM — E78.00 HIGH CHOLESTEROL: Status: RESOLVED | Noted: 2025-07-09 | Resolved: 2025-07-09

## 2025-07-09 PROBLEM — S92.511A CLOSED FRACTURE OF PROXIMAL PHALANX OF LESSER TOE OF RIGHT FOOT, INITIAL ENCOUNTER: Status: ACTIVE | Noted: 2025-07-09

## 2025-07-09 PROCEDURE — 28510 TREATMENT OF TOE FRACTURE: CPT

## 2025-07-09 PROCEDURE — 28450 TX TARSAL B1 FX W/O MNPJ EA: CPT

## 2025-07-09 PROCEDURE — 99204 OFFICE O/P NEW MOD 45 MIN: CPT | Mod: 25

## 2025-07-09 PROCEDURE — L4361: CPT | Mod: RT

## 2025-07-14 ENCOUNTER — TRANSCRIPTION ENCOUNTER (OUTPATIENT)
Age: 40
End: 2025-07-14

## 2025-07-15 ENCOUNTER — APPOINTMENT (OUTPATIENT)
Dept: PAIN MANAGEMENT | Facility: CLINIC | Age: 40
End: 2025-07-15

## 2025-07-16 ENCOUNTER — APPOINTMENT (OUTPATIENT)
Dept: OBGYN | Facility: CLINIC | Age: 40
End: 2025-07-16

## 2025-07-30 ENCOUNTER — APPOINTMENT (OUTPATIENT)
Dept: ORTHOPEDIC SURGERY | Facility: CLINIC | Age: 40
End: 2025-07-30
Payer: COMMERCIAL

## 2025-07-30 PROCEDURE — 73630 X-RAY EXAM OF FOOT: CPT | Mod: RT

## 2025-07-30 PROCEDURE — 99024 POSTOP FOLLOW-UP VISIT: CPT

## 2025-07-30 NOTE — ED PROVIDER NOTE - IV ALTEPLASE EXCL ABS HIDDEN
Hypertension is uncontrolled.  Uptitrate spironolactone to 50 mg p.o. daily and discontinue potassium chloride.  Medication changes per orders.  Dietary sodium restriction.  Weight loss.  Regular aerobic exercise.  Stop smoking.  Ambulatory blood pressure monitoring.  Blood pressure will be reassessedin 3 months.   show

## 2025-08-13 ENCOUNTER — APPOINTMENT (OUTPATIENT)
Dept: ORTHOPEDIC SURGERY | Facility: CLINIC | Age: 40
End: 2025-08-13
Payer: COMMERCIAL

## 2025-08-13 VITALS — HEIGHT: 61 IN | BODY MASS INDEX: 38.51 KG/M2 | WEIGHT: 204 LBS

## 2025-08-13 DIAGNOSIS — S92.511D DISPLACED FRACTURE OF PROXIMAL PHALANX OF RIGHT LESSER TOE(S), SUBSEQUENT ENCOUNTER FOR FRACTURE WITH ROUTINE HEALING: ICD-10-CM

## 2025-08-13 DIAGNOSIS — S92.251D DISPLACED FRACTURE OF NAVICULAR [SCAPHOID] OF RIGHT FOOT, SUBSEQUENT ENCOUNTER FOR FRACTURE WITH ROUTINE HEALING: ICD-10-CM

## 2025-08-13 PROCEDURE — 73600 X-RAY EXAM OF ANKLE: CPT | Mod: RT

## 2025-08-13 PROCEDURE — 99024 POSTOP FOLLOW-UP VISIT: CPT

## 2025-08-13 PROCEDURE — 73630 X-RAY EXAM OF FOOT: CPT | Mod: RT

## 2025-08-25 ENCOUNTER — TRANSCRIPTION ENCOUNTER (OUTPATIENT)
Age: 40
End: 2025-08-25

## 2025-09-03 ENCOUNTER — APPOINTMENT (OUTPATIENT)
Dept: ORTHOPEDIC SURGERY | Facility: CLINIC | Age: 40
End: 2025-09-03
Payer: COMMERCIAL

## 2025-09-03 ENCOUNTER — APPOINTMENT (OUTPATIENT)
Dept: MRI IMAGING | Facility: CLINIC | Age: 40
End: 2025-09-03
Payer: COMMERCIAL

## 2025-09-03 VITALS — HEIGHT: 61 IN | BODY MASS INDEX: 38.51 KG/M2 | WEIGHT: 204 LBS

## 2025-09-03 DIAGNOSIS — S92.511D DISPLACED FRACTURE OF PROXIMAL PHALANX OF RIGHT LESSER TOE(S), SUBSEQUENT ENCOUNTER FOR FRACTURE WITH ROUTINE HEALING: ICD-10-CM

## 2025-09-03 DIAGNOSIS — S93.491A SPRAIN OF OTHER LIGAMENT OF RIGHT ANKLE, INITIAL ENCOUNTER: ICD-10-CM

## 2025-09-03 PROCEDURE — 99024 POSTOP FOLLOW-UP VISIT: CPT

## 2025-09-03 PROCEDURE — 73721 MRI JNT OF LWR EXTRE W/O DYE: CPT | Mod: RT

## 2025-09-03 PROCEDURE — 73630 X-RAY EXAM OF FOOT: CPT | Mod: RT

## 2025-09-11 ENCOUNTER — APPOINTMENT (OUTPATIENT)
Dept: ORTHOPEDIC SURGERY | Facility: CLINIC | Age: 40
End: 2025-09-11
Payer: COMMERCIAL

## 2025-09-11 DIAGNOSIS — S92.251D DISPLACED FRACTURE OF NAVICULAR [SCAPHOID] OF RIGHT FOOT, SUBSEQUENT ENCOUNTER FOR FRACTURE WITH ROUTINE HEALING: ICD-10-CM

## 2025-09-11 PROCEDURE — L4350: CPT | Mod: RT

## 2025-09-11 PROCEDURE — 99024 POSTOP FOLLOW-UP VISIT: CPT

## 2025-09-12 ENCOUNTER — NON-APPOINTMENT (OUTPATIENT)
Age: 40
End: 2025-09-12

## 2025-09-16 ENCOUNTER — APPOINTMENT (OUTPATIENT)
Dept: OBGYN | Facility: CLINIC | Age: 40
End: 2025-09-16
Payer: COMMERCIAL

## 2025-09-16 VITALS
SYSTOLIC BLOOD PRESSURE: 136 MMHG | WEIGHT: 201 LBS | DIASTOLIC BLOOD PRESSURE: 91 MMHG | BODY MASS INDEX: 37.95 KG/M2 | HEIGHT: 61 IN

## 2025-09-16 DIAGNOSIS — Z01.419 ENCOUNTER FOR GYNECOLOGICAL EXAMINATION (GENERAL) (ROUTINE) W/OUT ABNORMAL FINDINGS: ICD-10-CM

## 2025-09-16 PROCEDURE — 99396 PREV VISIT EST AGE 40-64: CPT

## 2025-09-17 ENCOUNTER — APPOINTMENT (OUTPATIENT)
Dept: PULMONOLOGY | Facility: CLINIC | Age: 40
End: 2025-09-17
Payer: COMMERCIAL

## 2025-09-17 VITALS
WEIGHT: 201 LBS | SYSTOLIC BLOOD PRESSURE: 130 MMHG | HEART RATE: 83 BPM | OXYGEN SATURATION: 99 % | BODY MASS INDEX: 37.95 KG/M2 | HEIGHT: 61 IN | DIASTOLIC BLOOD PRESSURE: 83 MMHG

## 2025-09-17 DIAGNOSIS — R07.89 OTHER CHEST PAIN: ICD-10-CM

## 2025-09-17 PROCEDURE — 99214 OFFICE O/P EST MOD 30 MIN: CPT

## 2025-09-17 RX ORDER — NORGESTIMATE AND ETHINYL ESTRADIOL 7DAYSX3 LO
0.18/0.215/0.25 KIT ORAL
Qty: 84 | Refills: 3 | Status: DISCONTINUED | COMMUNITY
Start: 2025-09-16 | End: 2025-09-17

## 2025-09-17 RX ORDER — BUDESONIDE AND FORMOTEROL FUMARATE DIHYDRATE 80; 4.5 UG/1; UG/1
80-4.5 AEROSOL RESPIRATORY (INHALATION)
Qty: 1 | Refills: 3 | Status: ACTIVE | COMMUNITY
Start: 2025-09-17 | End: 1900-01-01

## 2025-09-18 LAB — HPV HIGH+LOW RISK DNA PNL CVX: NOT DETECTED

## 2025-09-22 LAB — CYTOLOGY CVX/VAG DOC THIN PREP: NORMAL
